# Patient Record
Sex: FEMALE | Race: WHITE | NOT HISPANIC OR LATINO
[De-identification: names, ages, dates, MRNs, and addresses within clinical notes are randomized per-mention and may not be internally consistent; named-entity substitution may affect disease eponyms.]

---

## 2023-08-25 ENCOUNTER — APPOINTMENT (OUTPATIENT)
Dept: ORTHOPEDIC SURGERY | Facility: CLINIC | Age: 48
End: 2023-08-25
Payer: COMMERCIAL

## 2023-08-25 ENCOUNTER — NON-APPOINTMENT (OUTPATIENT)
Age: 48
End: 2023-08-25

## 2023-08-25 VITALS
BODY MASS INDEX: 25.9 KG/M2 | SYSTOLIC BLOOD PRESSURE: 132 MMHG | DIASTOLIC BLOOD PRESSURE: 70 MMHG | HEART RATE: 70 BPM | HEIGHT: 67 IN | WEIGHT: 165 LBS

## 2023-08-25 DIAGNOSIS — L03.011 CELLULITIS OF RIGHT FINGER: ICD-10-CM

## 2023-08-25 PROBLEM — Z00.00 ENCOUNTER FOR PREVENTIVE HEALTH EXAMINATION: Status: ACTIVE | Noted: 2023-08-25

## 2023-08-25 PROCEDURE — 99203 OFFICE O/P NEW LOW 30 MIN: CPT

## 2023-08-25 RX ORDER — CEPHALEXIN 500 MG/1
500 TABLET ORAL 3 TIMES DAILY
Qty: 20 | Refills: 0 | Status: ACTIVE | COMMUNITY
Start: 2023-08-25 | End: 1900-01-01

## 2023-08-25 NOTE — PHYSICAL EXAM
[de-identified] : - Constitutional: This is a female in no obvious distress.   - Psych: Patient is alert and oriented to person, place and time.  Patient has a normal mood and affect. - Cardiovascular: Normal pulses throughout the upper extremities.  No significant varicosities are noted in the upper extremities.  - Neuro: Strength and sensation are intact throughout the upper extremities.  Patient has normal coordination. - Respiratory:  Patient exhibits no evidence of shortness of breath or difficulty breathing. - Skin: No rashes, lesions, or other abnormalities are noted in the upper extremities.  ---  Examination of her right index finger demonstrates mild erythema along the ulnar aspect of the index finger nail fold.  She has findings consistent with a mild paronychia.  There is no obvious pus or deep infection.  She is neurovascularly intact distally.

## 2023-08-25 NOTE — DISCUSSION/SUMMARY
[FreeTextEntry1] : She has findings consistent with an improving right index finger paronychia.  There is no obvious pus.  I had a discussion regarding today's visit, the prognosis of this diagnosis and treatment recommendations and options.  At this time, I recommended a 4-day course of Keflex 500 mg 3 times daily and 3 times daily warm soaks.  She will follow-up next week if she is having residual problems or before then if she has worsening pain, swelling or fevers or chills.  The patient has agreed to this plan of management and has expressed full understanding.  All questions were fully answered to the patient's satisfaction.  My cumulative time spent on this patient's visit included: Preparation for the visit, review of the medical records, review of pertinent diagnostic studies, examination and counseling of the patient on the above diagnosis, treatment plan and prognosis, orders of diagnostic tests, medications and/or appropriate procedures and documentation in the medical records of today's visit.